# Patient Record
Sex: FEMALE | Race: WHITE
[De-identification: names, ages, dates, MRNs, and addresses within clinical notes are randomized per-mention and may not be internally consistent; named-entity substitution may affect disease eponyms.]

---

## 2021-12-19 ENCOUNTER — HOSPITAL ENCOUNTER (INPATIENT)
Dept: HOSPITAL 46 - ED | Age: 41
LOS: 5 days | Discharge: HOME | DRG: 872 | End: 2021-12-24
Attending: INTERNAL MEDICINE | Admitting: INTERNAL MEDICINE
Payer: COMMERCIAL

## 2021-12-19 VITALS — BODY MASS INDEX: 28.03 KG/M2 | HEIGHT: 67 IN | WEIGHT: 178.57 LBS

## 2021-12-19 DIAGNOSIS — Z90.710: ICD-10-CM

## 2021-12-19 DIAGNOSIS — Z20.822: ICD-10-CM

## 2021-12-19 DIAGNOSIS — Z91.14: ICD-10-CM

## 2021-12-19 DIAGNOSIS — Z87.891: ICD-10-CM

## 2021-12-19 DIAGNOSIS — A41.02: Primary | ICD-10-CM

## 2021-12-19 DIAGNOSIS — E11.65: ICD-10-CM

## 2021-12-19 DIAGNOSIS — L03.116: ICD-10-CM

## 2021-12-19 DIAGNOSIS — Z79.2: ICD-10-CM

## 2021-12-19 PROCEDURE — A9270 NON-COVERED ITEM OR SERVICE: HCPCS

## 2021-12-19 PROCEDURE — G0378 HOSPITAL OBSERVATION PER HR: HCPCS

## 2021-12-19 PROCEDURE — U0003 INFECTIOUS AGENT DETECTION BY NUCLEIC ACID (DNA OR RNA); SEVERE ACUTE RESPIRATORY SYNDROME CORONAVIRUS 2 (SARS-COV-2) (CORONAVIRUS DISEASE [COVID-19]), AMPLIFIED PROBE TECHNIQUE, MAKING USE OF HIGH THROUGHPUT TECHNOLOGIES AS DESCRIBED BY CMS-2020-01-R: HCPCS

## 2021-12-19 PROCEDURE — C9803 HOPD COVID-19 SPEC COLLECT: HCPCS

## 2021-12-20 NOTE — NUR
PATIENT ASSESSMENT COMPLETE. LR RUNNING  MLS/HR. BLOOD SUGAR WAS WNL.
ALERT AND ORIENTED X4. LUNG SOUNDS ARE CLEAR THROUGHOUT. RR AND OXYGEN
SATURATIONS ARE WNL. DENIES SHORTNESS OF BREATH. PATIENT COMPLAINS OF 8/10
PAIN IN LEFT FOOT. PRN TYLENOL GIVEN. PATIENT ALSO IS FEELING NAUSEOUS. PRN
ZOFRAN GIVEN. PATIENT LEFT FOOT HAS GENERALIZED EDEMA. PULSES FELT STRONG.
CALF TENDERNESS NOTED. AREA IS STILL REDDENED. CMST INTACT. AFEBRILE. URINE
OUTPUT IS CLEAR AND YELLOW. BOWEL TONES ACTIVE. PLAN OF CARE UPDATED. NO
QUESTIONS AT THIS TIME. CALL LIGHT WITHIN REACH NO FUTHER NEEDS.

## 2021-12-20 NOTE — NUR
CALL LIGHT ANSWERED. SBA WITH FWW FROM RESTROOM TO  ML VOID AND MEDIUM
SOFT BM. pt CHANGED TO SHORTS, TANK TOP AT THIS TIME FOR COMFORT. TEMPERATURE
IN ROOM ADJUSTED. ICE WATER REFILLED. IV SITE FLUSHED WNL, IVF INFUSING.
CALL LIGHT IN REACH.

## 2021-12-20 NOTE — NUR
PATIENT RESTING IN BED. BREATHING IS EQUAL AND UNLABORED. PATIENT SAYS PAIN
AND NASEAU HAVE DECREASED. CALL LIGHT WITHIN REACH NO FUTHER NEEDS.

## 2021-12-20 NOTE — NUR
INTO PATIENT ROOM. PATIENT RESTING IN BED WATCHING TV. PATIENT STATES SHE
LIVES IN A HOME WITH HER SO NIGHAT AND HER CHILDREN. PATIENT DENIES ANY NEED
FOR DME. PATIENT STATES HER MOTHER EVELYN ([phone]) IS CURRENTLY VISITING
FROM [employer]. PATIENT DENIES ANY FINANCIAL NEEDS AT THIS TIME. PATIENT STATES
SHE HAS SEEN [facility] IN THE PAST, BUT HAS NOT SEEN A PRIMARY IN SOME
TIME. ADVISED PATIENT THAT SHE WILL NEED TO FOLLOW UP WITH A PCP AT DISCHARGE.
DEMOGRAPHIC INFORMATION SHREYAS WILL CONTINUE TO FOLLOW UP WITH PATIENT DURING
HER VISIT.

## 2021-12-20 NOTE — NUR
REPORT RECEIVED FROM EVGENY LOPEZ. PT AWAKE SITTING UP IN BED. PT ASKED IF SHE IS
HAVING ANY PAIN RIGHT NOW, SHE STATES, "NO, ITS NOT TOO BAD RIGHT NOW." PT
GIVEN MENU AND INSTRUCTIONS FOR ORDERING BREAKFAST.

## 2021-12-20 NOTE — NUR
ASSESSMENT DONE, PT HAS LEFT LEG ELEVATED ON PILLOWS. CMS INTACT, REDNESS
NOTED TO LLE WITH AREA MARKED BY PATIENT. IVF INFUSING. PT DENIES NEED FOR
PAIN MEDICATION AT THIS TIME.

## 2021-12-20 NOTE — NUR
EVENING ASSESSMENT COMPLETE. SCHEDULED MEDS ADMINISTERED PER EMAR. PT REPORTS
PAIN IS TOLERABLE, DOES NOT WANT PRN FOR PAIN AT THIS TIME. DENIES NAUSEA. LLE
ELEVATED ON PILLOWS. SKIN RED AND WARM. NO OPEN AREAS NOTED. RESTING 'S.
PT AFEBRILE. PT DENIES QUESTIONS OR CONCERNS. FRESH DRINKS PROVIDED. CALL
LIGHT IN REACH.

## 2021-12-20 NOTE — EKG
[facility]
                                    [address]
                                  [place], [place]  [address]
_________________________________________________________________________________________
                                                                 Signed   
 
 
Sinus tachycardia
Otherwise normal ECG
No previous ECGs available
Confirmed by JULIETA DALE MD ([id number]) on 12/20/2021 4:18:16 PM
 
 
 
 
 
 
 
 
 
 
 
 
 
 
 
 
 
 
 
 
 
 
 
 
 
 
 
 
 
 
 
 
 
 
 
 
 
 
 
 
 
    Electronically Signed By: JULIETA DALE MD  12/20/21 1618
_________________________________________________________________________________________
PATIENT NAME:     TEA LANDIN                   
MEDICAL RECORD #: [id number]                     Electrocardiogram             
          ACCT #: [id number]  
DATE OF BIRTH:   [birth year]                                       
PHYSICIAN:   JULIETA DALE MD           REPORT #: [id number]
REPORT IS CONFIDENTIAL AND NOT TO BE RELEASED WITHOUT AUTHORIZATION

## 2021-12-20 NOTE — NUR
REPORT RECEIVED FROM DAY SHIFT RN. PT LYING IN BED ALERT AND ORIENTED. LEFT
LEG ELEVATED ON PILLOWS. UP TO BR WITH SBA. GAIT STEADY. PT WILL CALL WHEN
READY TO GO BACK TO BED. WHITE BOARD UPDATED.

## 2021-12-20 NOTE — NUR
New admit to medical floor from CCU. Patient alert and oriented x4. Patient
reports pain is tolerable. IV patent. Patient oriented to room and call light.

## 2021-12-20 NOTE — NUR
PATIENT ASSESSMENT COMPLETE. BOLUS OF LR RUNNING  MLS/HR. VANCO RUNNING
 MLS/HR. IV SITES PATENT. PATIENT IS ALERT AND ORIENTED X4. LUNG SOUNDS
ARE CLEAR THROUGHOUT. OXYGEN SATURATIONS AND RR ARE WNL. HEART RATE IS SINUS
TACH AND PATIENT IS AFEBIRLE. DENIES SHORTNESS OF BREATH. PATIENT IS IN 9/10
PAIN FROM LEFT FOOT. LEFT FOOT HAS 2+ PITTING EDEMA. 11 CM REDDENED AREA. SKIN
INTACT AND DRY. PICTURE OF WOUND IN THE CHART. CMST INTACT. PRN TORADOL GIVEN
FOR PAIN. URINE IS CLEAR AND YELLOW. PATIENT DENIES NASEAU. BOWEL TONES ARE
ACTIVE. PATIENT EDUCATED ON DIABETES. PATIENT STATES SHE HAS NOT TAKEN HER
ORAL DIABETICS FOR OVER A YEAR NOW. BLOOD SUGAR  AND GIVEN INSULIN PER
SLIDING SCALE. PLAN OF CARE UPDATED. NO QUESTIONS AT THIS TIME. CALL LIGHT
WITHIN REACH NO FUTHER NEEDS.

## 2021-12-21 NOTE — NUR
IV ABX INFUSING WNL. SLIDING SCALE INSULIN ADMINISTERED FOR . PT REPORTS
SHE IS RESTING WELL. DENIES PAIN OR NAUSEA. NO FURTHER NEEDS AT THIS TIME.

## 2021-12-21 NOTE — NUR
PATIENT IN BED WATCHING TV, FAMILY IN ROOM. VITALS AND I&O'S CHARTED. FRESH
WATER GIVEN. CALL LIGHT IN REACH. NO FURTHER NEEDS AT THIS TIME.

## 2021-12-21 NOTE — NUR
EVENING ASSESSMENT COMPLETE. IV ABX INFUSING WNL. PT DENIES PAIN WITH
INFUSION. REPORTS PAIN IS TOLERABLE AT THIS TIME. DENIES NAUSEA. LLE ELEVATED
ON PILLOWS. REDNESS, WARMTH, AND EDEMA NOTED. CMS INTACT. TEMP 98.9. 'S.
PT DENIES QUESTIONS OR CONCERNS. CALL LIGHT IN REACH.

## 2021-12-21 NOTE — NUR
PATIENT IN BED RESTING. VITALS AND I&O'S CHARTED. CALL LIGHT IN REACH. NO
FURTHER NEEDS AT THIS TIME.

## 2021-12-21 NOTE — NUR
REPORT RECEIVED FROM DAY SHIFT RN. PT LYING IN BED ALERT AND ORIENTED. DENIES
NEEDS AT THIS TIME. CALL LIGHT IN REACH. WHITE BOARD UPDATED.

## 2021-12-21 NOTE — NUR
PT ALERT, ORIENTED AND SITTING UP IN BED WATCHING TV.PT IS PLEASANT, HAS
NEVER DEALT WITH THIS BEFORE. GAVE ENCOURAGEMENT, G.POST AND WILL FOLLOW
AS NEEDED

## 2021-12-21 NOTE — NUR
Spoke with pt and needs at this time are for a PCP and glucometer.  She [facility] by Jerel Grullon.  She is aware
he has moved.  She would like to see Dr. Trent and I will fax her chart and
request she can establish care with him. Chart faxed to [facility].  Will follow up
tomorrow.
Called Patel in Pharm as pt is Type II diabetic and in need of a glucometer. He
will see her and give her one.

## 2021-12-21 NOTE — NUR
ANSWERED CALL LIGHT. SBA TO THE BATHROOM AND BACK TO BED USING WALKER. PATIENT
C/O HEADACHE AND FOOT HURT. PRIMARY RN ISABELA NOTIFIED.

## 2021-12-21 NOTE — NUR
Patient resting in bed with left foot elevated. Patient reports her pain is
tolerable in LLE. LLE continues to be swollen and red. IV site patent. Fluids
infusing per provider order. Patient has no needs. Personal supplies and call
light within reach.

## 2021-12-21 NOTE — NUR
CNA REPORTS TO THIS RN PT C/O LEFT FOOT AND HEADACHE PAIN. PRN ADMINISTERED
FOR PAIN PER EMAR. LLE ELEVATED. NO FURTHER NEEDS.

## 2021-12-22 NOTE — NUR
IV ABX COMPLETE. PT SL PER ORDER. PT UP TO AMB ONCE AROUND NURSING UNIT WITH
FWW. GAIT STEADY. NETTE WELL. BACK TO BED. NO FURTHER NEEDS.

## 2021-12-22 NOTE — NUR
PATIENT TOOK A SHOWER AND IS BACK RESTING IN BED. % OF HER LUNCH. FRESH
ICE WATER RECIEVED. SHE DOES NOT NEED ANYTHING ELSE AT THIS TIME. CALL LIGHT
IN REACH.

## 2021-12-22 NOTE — NUR
pt AWAKE RESTING IN BED. DENIES PAIN AT REST. IV SITE FLUSHED WNL IV
ANTIBIOTIC INFUSING AS ORDERED. CBG WNL. ASSESSMENT COMLETE. LLE FOOT HOT TO
TOUCH. SOME REPORTED NUMBNESS IN TOES, pt STATES IS CHRONIC. URINE DUMPED FROM
HAT. CALL LIGHT IN REACH.

## 2021-12-22 NOTE — NUR
THIS RN IN PTS ROOM TO GIVE PT HER DOSE OF VANCO. PT SITTING UP IN BED. PT
REPORTS THAT HER PAIN IS 5/10 IN HER LEFT FOOT, THIS LEVEL IS TOLERABLE FOR PT
AND SHE IS NOT REQUESTING PAIN MEDS AT THIS TIME.
PTS FOOT APPEARS RED AND SLIGHTLY SWOLLEN, PT REPORTS THAT IT LOOKS IMPROVED
AT THIS TIME.
PT REQUESTING A SHOWER FOR AFTER THE INFUSION. THIS CAN BE ARRAGNGED.
PT HAS NO OTHER STATED NEEDS AT THIS TIME. CALL LIGHT WITHIN REACH

## 2021-12-22 NOTE — NUR
CALL LIGHT ANSWERED. IV ANTIBIOTIC COMPLETE. IV SL WNL. CALL LIGHT IN REACH.
NO REQUESTS AT THIS TIME. pt IS AWAKE WATCHING TV.

## 2021-12-22 NOTE — NUR
THIS RN IN PTS ROOM TO GIVE SCHEDULED VANCO. PT APPEARS TO BE RESTING BUT
AWAKENS TO DOOR OPENING. PT STATES THAT SHE IS DOING WELL JUST SLEEPY. THIS RN
STARTED INFUSION, IV FLUSHES WELL, PROVIDED PT WITH WARM BLANKET AROUND IV
SITE DUE TO PT STATING THAT SHE GETS COLD WITH THE REFRIGERATED FLUIDS.
PT STATES THAT SHE IS DOING WELL OTHERWISE AND NO OTHER NEEDS NOTED.

## 2021-12-22 NOTE — NUR
PT UP TO BR WITH FWW TO VOID AND HAVE MEDIUM SOFT BM. GAIT STEADY. BACK TO
BED. PRN FOR LEFT FOOT/HEADACHE PAIN ADMINISTERED PER EMAR. IV ABX INFUSING
INFUSING WNL. PT DENIES PAIN WITH INFUSION. WARM BLANKET AND FRESH WATER
PROVIDED. PT DENIES FURTHER NEEDS. CALL LIGHT IN REACH.

## 2021-12-22 NOTE — NUR
verona Davalos scheduled with Dr. Trent for Monday Dec 27 at 1 pm to
establish care.  Pharmacy will provide a glucometer for her.  She denies needs
states she is feeling better.

## 2021-12-22 NOTE — NUR
BLOOD SUGARS TAKEN. THIS RN EDUCATED ABOUT DOSING. PTS MOM AT BEDSIDE- ALL
QUESTIONS ANSWERED TO THE BEST OF THIS RNS ABILITY

## 2021-12-22 NOTE — NUR
VS AND I&O COMPLETE. TEMP 99.0. PT DENIES PAIN AT THIS TIME. LLE ELEVATED ON
PILLOWS. NO FURHTER NEEDS. CALL LIGHT IN REACH.

## 2021-12-22 NOTE — NUR
REPORT RECEIVED FROM JOHN BALL. pt RESTING IN BED. C/O HA, LEFT FOOT PAIN 5/10.
PRN TORADOL ADMINISTERED AT THIS TIME. IV SITE FLUSHED AND SL WNL. CALL LIGHT
IN REACH. NO ADDITIONAL REQUESTS.

## 2021-12-23 NOTE — NUR
CBG rechecked 113, pt states she feels much better, resting in bed safely w/
call light in reach, no further needs at this time

## 2021-12-23 NOTE — NUR
BLOOD SUGAR CHECK AT BEDSIDE, PT ALERT AND ORIENTED AT THIS TIME, HER MOTHER
IS AT BEDSIDE. NO DISTRESS NOTED, WARM COMPRESS OFF AT THIS TIME.

## 2021-12-23 NOTE — NUR
Pt called stating she is feeling nauseous and clamy. CBG 67, pt given a cup of
juice, granola bar, peanut butter, and PRN Zofran. Will recheck CBG in 15 min.
Pt resting in bed safely w/ call light in reach

## 2021-12-23 NOTE — NUR
PT LAYING IN BED ON R.SIDE WATCHING TV. PT IS ALERT AND ORIENTED AND SEEMS
TO BE TAKING EVERYTHING IN STRIDE. WAITING FOR CONSULT WITH DR SOTELO. GAVE
BLESSING AND WILL FOLLOW

## 2021-12-23 NOTE — NUR
Pt resting in bed safely w/ call light in reach, pt denies any needs at this
time. Afternoon dose of IV Vanco held due to high trough results.

## 2021-12-23 NOTE — NUR
Pt sitting up in bed eating breakfast, w/ call light in reach. Morning
assesment complete, scheduled meds given, and IV abx hung and infusing per
provider order. Pt c/o L foot pain and headache 5/10, PRN pain meds given per
request. No further needs at this time

## 2021-12-23 NOTE — NUR
PRN NAUSEA MEDICATION ADMINISTERED. pt REQUESTING SNACK, YOGURT PROVIDED. CALL
LIGHT IN REACH. NO ADDITIONAL REQUESTS.

## 2021-12-23 NOTE — NUR
Shift report received from JOHN Vargas, pt resting in bed safely w/ call light
in reach, no needs at this time

## 2021-12-23 NOTE — NUR
PT CONCERNED ABOUT BEING NPO AT MIDNIGHT, SHE ASKED IF SHE CAN HAVE SOMETHING
TO EAT BEFORE MIDNIGHT, SHE HAS BEEN GIVEN A LUNCH BOX MEAL AT THIS TIME.

## 2021-12-23 NOTE — NUR
CALL LIGHT ON. ANTIBIOTIC INFUSION COMPLETED. SL, pt UP TO VOID AND BACK TO
BED. SBA FWW. LIGHT YELLOW URINE. NEXT ANTIBIOTIC INFUSING PER ORDERS. CALL
LIGHT WITHIN REACH. PROVIDED WITH WARM BLANKET.

## 2021-12-23 NOTE — NUR
PT. VITALS AND IS AND OS CHARTED ACCORDINGLY. ROOM TIDIED, TRASH EMPTIED, CALL
LIGHT LEFT WITHIN REACH, NO OTHER IMMEDIATE NEEDS AT THIS TIME.

## 2021-12-23 NOTE — NUR
pt SLEEPING, AWAKENS TO VOICE FOR IV ANTIBIOTIC ADMINISTRATION. IV SITE
FLUSHED WNL. IV ANTIBIOTIC INFUSING AS ORDERED. pt INSTRUCTED TO NOTIFY RN IF
ANY PAIN OR LEAKING AT IV SITE, VERBALIZES UNDERSTANDING. ASSESSMENT COMPLETE.
NUMBNESS RESOLVED IN LEFT TOES PER pt. CAP REFILL WNL. LEFT FOOT HOT TO TOUCH,
EDEMA IMPROVED. LEG ELEVATED ON PILLOWS. URINE EMPTIED FROM HAT. CALL LIGHT IN
REACH.

## 2021-12-23 NOTE — NUR
BEDSIDE REPORT FROM LORAINE LUNA RN, PT RESTING IN BED WITH MOIST HEAT TO LEFT
LATERAL FOOT PER  ORDER. NOTED THAT PT DOES NOT APPEAR TO HAVE ANY
DISTRESS AT THIS TIME. HER MOTHER IS AT BEDSIDE. PT AND HER MOTHER WANTED TO
DISCUSS CARE PLAN, DID SO,  WILL ROUND IN THE AM TO REASSESS LEFT FOOT
FOR POSSIBLE NEED TO I&D.

## 2021-12-24 PROCEDURE — 0J9R0ZZ DRAINAGE OF LEFT FOOT SUBCUTANEOUS TISSUE AND FASCIA, OPEN APPROACH: ICD-10-PCS | Performed by: SURGERY

## 2021-12-24 NOTE — NUR
ALL DISCHARGE AND WOUND CARE INSTRUCTIONS REVIEWED WITH PT AND QUESTIONS
ANSWERED. PT. LEFT VIA WHEELCHAIR WITH ALL BELONGINGS WITH CNA AND MOTHER.
VITALS STABLE AND WOUND DRESSING C.D.I.  PT. DENIES PAIN.

## 2021-12-24 NOTE — NUR
REPORT RECEIVED FROM NIGHT RN AND PT. CARE RESUMED. PT. IS ALERT AND ORIENTED.
+1 EDEMA PRESENT TO LATERAL LEFT FOOT WITH REDNESS. PT. PREPPED FOR PROCEDURE.
CONSENT SIGNED.

## 2021-12-24 NOTE — NUR
PT UP TO BATHROOM, ABX INFUSION STARTED, PT REPORTS HER LEFT FOOT IS
MOREPAINFUL THAN IT HAS BEEN SINCE ADMISSION, TYLENOL ADMINISTERED WITH SIP OF
WATER, WILL MONITOR FOR AFFECTIVENESS.

## 2021-12-24 NOTE — NUR
PT. DENIES PAIN AT THIS TIME. GAUZE DRESSING IS CDI ON LEFT LATERAL FOOT. +1
EDEMA PRESENT.
VITALS STABLE. PT. DENIES FURTHER NEEDS AND LEFT RESTING WITH CALL LIGHT IN
REACH.

## 2021-12-24 NOTE — NUR
ALL LIQUIDS AND FOOD AT BEDSIDE REMOVED AT THIS TIME, PT ALERT TO RN IN ROOM,
SHE VERBALIZED NO NEEDS AT THIS TIME.

## 2021-12-24 NOTE — NUR
PT HAS BEEN UP INTERMITTENT TO VOID IN BATHROOM WITH FWW. SHE HAS HAD PRN
TYLENOL THIS AM FOR PAIN 5/10, SHE HAS HAD 3 OF 4 WARM MOIST HEAT PACKS IN THE
FORM OF WARM WASH CLOTH PLACED TO LEFT LATERAL FOOT. WILL PLACE 4TH PER ORDERS
BY THE END OF THIS SHIFT. THE LEFT LATERAL RED AREA APPEARS TO BE DEEPER RED
THIS AM, PT REPORTS INCREASE IN PAIN. PT HAS SLEEP WELL LATER HALD OF SHIFT.
SHE HAS BEEN NPO SINCE MIDNIGHT OTHER THAN TYLENOL WITH SIP OF WATER AT 0400.

## 2021-12-24 NOTE — NUR
PT HAS RETURNED FROM SURGERY. SHE IS ALERT AND ORIENTED AND EATING MEAL.PT HAS
FAMILY AT , HAS GREAT ATTITUDE. LEFT COLORING BOOK AND GAVE BLESSING. WILL
FOLLOW AS NEEDED

## 2021-12-24 NOTE — NUR
12/24/21 0932 Lori Arana 0917 PT ARRIVED IN PACU NON RESPONSIVE TO NOXIOUS STIMULI WITH OPA
IN PLACE. CHIN LIFT HELD BY RN. 0929 PT REACTIVE. OPA REMOVED.

## 2021-12-24 NOTE — NUR
PT. ARRIVED BACK TO ROOM VIA STRETCHER. REPORT RECEIVED. SHE AMBULATED FROM
THE STRETCHER TO THE BATHROOM AND TOLERATED WELL. SHE REPORTS MODERATE,
TOLERABLE PAIN IN THE LEFT FOOT. GAUZE DRESSING IS CDI. VITALS STABLE.PT ON
ROOM AIR. IV WNL AND FLUSHES WELL.

## 2021-12-29 NOTE — OR
[facility]
                                    [address], [place]  [address]
_________________________________________________________________________________________
                                                                 Signed   
 
 
DATE OF OPERATION:
12/24/2021
 
SURGEON:
Anurag Sotelo MD
 
PREOPERATIVE DIAGNOSIS:
Left forefoot cellulitis, possible abscess.
 
POSTOPERATIVE DIAGNOSIS:
Abscess of left anterolateral forefoot.
 
PROCEDURES:
1. Exam under anesthesia.
2. Incision and drainage of abscess of left forefoot and placement of drain.
 
ANESTHESIA:
General LMA, Anurag Padilla CRNA
 
INDICATIONS:
This 41-year-old  woman has been admitted by Dr. Dale since December 19, 2021.
She is admitted with significant left lower extremity cellulitis.  As improvement of her
clinical findings of cellulitis has occurred, there appears to be a soft area possibly
fluctuant in the left anterolateral forefoot.  Warm compresses were applied yesterday
with leg elevation and today.  The area does appear to be likely an abscess.  I have
recommended incision and drainage of the site and other indicated procedures as
necessary.  She agrees to this.  She understands the risks of bleeding, infection,
failure to cure the infection and other unforeseen complications, particularly given her
underlying diabetes.  She understands and she wished to proceed. 
 
FINDINGS:
Indeed there was a purulent collection.  Edema of the forefoot was partially relieved
with drainage of the abscess.  A counter incision allowed for placement of yellow vessel
loop drain.  Irrigation was undertaken until clear.  Gram stain and cultures were
obtained as well. 
 
DESCRIPTION OF PROCEDURE:
The patient was brought to the operating room and given a general LMA type anesthetic.
The left lower extremity was prepared with a Betadine solution and draped sterilely.
Palpation of the area of erythema and possible fluctuance was undertaken and a small
incision made with a #15 blade.  Entry into the subcutaneous perifascial space allowed
for egress of copious amounts of purulent material.  Gram-stain and cultures were
 
    Electronically Signed By: ANURAG SOTELO MD  12/29/21 1321
_________________________________________________________________________________________
PATIENT NAME:     TEA LANDIN                   
MEDICAL RECORD #: [id number]            OPERATIVE REPORT              
          ACCT #: [id number]  
DATE OF BIRTH:   [birth year]            REPORT #: [id number]      
PHYSICIAN:        ANURAG SOTELO MD                 
PCP:              TAJ ZAPIEN MD           
REPORT IS CONFIDENTIAL AND NOT TO BE RELEASED WITHOUT AUTHORIZATION
 
 
                                  [facility]
                                    [address], [place]  [address]
_________________________________________________________________________________________
                                                                 Signed   
 
 
obtained.  A counter incision was made more proximally and a yellow vessel loop drawn
through the wound.  Wound was tied in a loop and elevated and irrigation undertaken with
sterile saline solution until clear.  The edema of the foot was gently milked free of
its edema.  A gauze dressing was applied as was a Kerlix gauze.  The patient was
ultimately 
allowed to emerge from anesthesia, extubated, and taken to the recovery room in good
condition. 
 
 
 
            ________________________________________
            MD TAMIA House/NALLELY
Job #:  [id number]
DD:  12/24/2021 09:25:26
DT:  12/24/2021 09:41:55
 
cc:            MD Valery Borrego FNP
 
 
Copies:  JULIETA DALE MD, JULIE FNP
~
 
 
 
 
 
 
 
 
 
 
 
 
 
 
 
 
    Electronically Signed By: ANURAG SOTELO MD  12/29/21 1321
_________________________________________________________________________________________
PATIENT NAME:     TEA LANDIN                   
MEDICAL RECORD #: [id number]            OPERATIVE REPORT              
          ACCT #: [id number]  
DATE OF BIRTH:   [birth year]            REPORT #: [id number]      
PHYSICIAN:        ANURAG SOTELO MD                 
PCP:              TAJ ZAPIEN MD           
REPORT IS CONFIDENTIAL AND NOT TO BE RELEASED WITHOUT AUTHORIZATION

## 2021-12-29 NOTE — CONS
[facility]
                                    [address], [place]  [address]
_________________________________________________________________________________________
                                                                 Signed   
 
 
DATE OF CONSULTATION:
12/23/2021
 
TIME:
6:00 p.m.
 
REQUESTING PHYSICIAN:
Dr. Flores.
 
PROBLEM:
Left forefoot possible fluid collection, recent cellulitis.
 
HISTORY OF PRESENT ILLNESS:
This 41-year-old woman has been admitted since December 19th with cellulitis of the
anterolateral aspect of her forefoot.  Review of her notes confirm her presentation to
urgent care, who thought she had a "bug bite."  She presented to the emergency room, was
noted to have clear evidence of cellulitis generally speaking.  She is admitted by Dr. Flores on December 20th.  IV antibiotics were initiated and she has had marked
improvement overall of her left lower extremity cellulitis. 
 
Dr. Flores's clinical examination today shows her to have a focal erythematous spot on
the anterolateral aspect of the forefoot, not far from the ankle with concerns that this
may represent an undrained fluid (abscess). 
 
Review of her lab studies show her initial white count on December 19th at 2130 hours
was 17.6, now down to 15.2.  Her medications currently administered have included
vancomycin antibiotic as well as ceftriaxone.  She has not been transitioned to an oral
antibiotic. 
 
It is notable that she has insulin-dependent diabetes mellitus.  She has no other known
immunosuppressant comorbidity, however. 
 
She has not had a prior foot infection that she is aware of.  She has had no fever or
chills since hospitalization.  Notably, she presented to the hospital, having been
placed on clindamycin 300 mg p.o. q.i.d. initially as an outpatient. 
 
REVIEW OF SYSTEMS:
Denies any shortness of breath or chest pain.  She has had no dysphagia or dysuria.  As
regards to her left forefoot, the pain is markedly improved.  She has not used any moist
heat on the area at this point. 
 
PHYSICAL EXAMINATION:
GENERAL:  Pleasant  woman, who looks to be in no significant distress at this
time. 
 
    Electronically Signed By: ANURAG SOTELO MD  12/29/21 1321
_________________________________________________________________________________________
PATIENT NAME:     TEA LANDIN                   
MEDICAL RECORD #: [id number]            CONSULTATION                  
          ACCT #: [id number]  
DATE OF BIRTH:   [birth year]            REPORT #: [id number]      
PHYSICIAN:        ANURAG SOTELO MD                 
PCP:              TAJ ZAPIEN MD           
REPORT IS CONFIDENTIAL AND NOT TO BE RELEASED WITHOUT AUTHORIZATION
 
 
                                  [facility]
                                    [address], [place]  [address]
_________________________________________________________________________________________
                                                                 Signed   
 
 
VITAL SIGNS:  Temperature is 98.2, pulse is 116, earlier today 101, blood pressure
121/67. 
EXTREMITIES:  __________ exam to the left lower extremity shows no sign of generalized
leg edema and Hermes's sign is negative.  She has a 2/3 dorsalis pulse.  On the forefoot
inferior to the ankle joint and the lateral aspect, there is an area of inflammation and
gentle palpation shows doughy changes.  There is no "head" of an abscess and although
mildly tender locally, does not have findings of distinct abscess at the moment.
Tendons are palpably normal.  Forefoot flexion does not induce tenderness nor does
plantar flexion. 
 
ASSESSMENT:
The patient is markedly improved from her initial complaint of lower extremity
cellulitis.  She still has an elevated white count, however.  The possibility of
undrained fluid is certainly notable in this area.  At this point, I would recommend
moist hot compress to the area and re-evaluation tomorrow morning.  If she still has
findings suggestive of undrained fluid collection, incision and drainage area would be
appropriate.  This may be diagnostically performed simply with a bedside aspiration with
a large bore needle after local anesthesia.  The possibility of placement of a drain in
this way is also consideration as well.  It is noted that the anatomy of the forefoot is
one of "tight spaces" and as she has no clear evidence of tendon infection in any way,
her current findings may simply be reflective of resolving edema from her cellulitis
itself. 
 
 
 
            ________________________________________
            MD TAMIA House/NALLELY
Job #:  [id number]
DD:  12/23/2021 18:09:14
DT:  12/23/2021 21:23:44
 
cc:            SEAN West MD
 
 
Copies:  JERICA SCHAFFER LOHITH VEERAPPA MD
~
 
 
 
    Electronically Signed By: ANURAG SOTEOL MD  12/29/21 1321
_________________________________________________________________________________________
PATIENT NAME:     TEA LANDIN                   
MEDICAL RECORD #: [id number]            CONSULTATION                  
          ACCT #: [id number]  
DATE OF BIRTH:   [birth year]            REPORT #: [id number]      
PHYSICIAN:        ANURAG SOTELO MD                 
PCP:              TAJ ZAPIEN MD           
REPORT IS CONFIDENTIAL AND NOT TO BE RELEASED WITHOUT AUTHORIZATION

## 2025-01-06 NOTE — NUR
VS AND I&O COMPLETE. PRN FOR PAIN ADMINISTERED PER EMAR FOR 7/10 LLE/HEADACHE
PAIN. LLE ELEVATED ON PILLOWS. REDNESS, EDEMA, AND WARMTH NOTED. UNCHANGED
FROM PREVIOUS ASSESSMENT. FRESH WATER PROVIDED. PT DENIES FURTHER NEEDS. CALL
LIGHT IN REACH. No